# Patient Record
Sex: MALE | Race: WHITE | NOT HISPANIC OR LATINO | URBAN - NONMETROPOLITAN AREA
[De-identification: names, ages, dates, MRNs, and addresses within clinical notes are randomized per-mention and may not be internally consistent; named-entity substitution may affect disease eponyms.]

---

## 2018-08-15 ENCOUNTER — APPOINTMENT (RX ONLY)
Dept: URBAN - NONMETROPOLITAN AREA CLINIC 28 | Facility: CLINIC | Age: 63
Setting detail: DERMATOLOGY
End: 2018-08-15

## 2018-08-15 VITALS — WEIGHT: 180 LBS | HEIGHT: 72 IN

## 2018-08-15 DIAGNOSIS — B36.0 PITYRIASIS VERSICOLOR: ICD-10-CM

## 2018-08-15 PROBLEM — L23.7 ALLERGIC CONTACT DERMATITIS DUE TO PLANTS, EXCEPT FOOD: Status: ACTIVE | Noted: 2018-08-15

## 2018-08-15 PROBLEM — J30.1 ALLERGIC RHINITIS DUE TO POLLEN: Status: ACTIVE | Noted: 2018-08-15

## 2018-08-15 PROCEDURE — ? PRESCRIPTION

## 2018-08-15 PROCEDURE — ? KOH PREP

## 2018-08-15 PROCEDURE — 99202 OFFICE O/P NEW SF 15 MIN: CPT

## 2018-08-15 PROCEDURE — 87220 TISSUE EXAM FOR FUNGI: CPT

## 2018-08-15 PROCEDURE — ? TREATMENT REGIMEN

## 2018-08-15 RX ORDER — KETOCONAZOLE 20 MG/G
CREAM TOPICAL BID
Qty: 1 | Refills: 1 | Status: ERX | COMMUNITY
Start: 2018-08-15

## 2018-08-15 RX ORDER — FLUCONAZOLE 150 MG/1
TABLET ORAL
Qty: 4 | Refills: 1 | Status: ERX | COMMUNITY
Start: 2018-08-15

## 2018-08-15 RX ADMIN — KETOCONAZOLE: 20 CREAM TOPICAL at 00:00

## 2018-08-15 RX ADMIN — FLUCONAZOLE: 150 TABLET ORAL at 00:00

## 2018-08-15 ASSESSMENT — LOCATION SIMPLE DESCRIPTION DERM: LOCATION SIMPLE: CHEST

## 2018-08-15 ASSESSMENT — LOCATION ZONE DERM: LOCATION ZONE: TRUNK

## 2018-08-15 ASSESSMENT — LOCATION DETAILED DESCRIPTION DERM: LOCATION DETAILED: STERNUM

## 2018-08-15 NOTE — PROCEDURE: KOH PREP
Koh Intro Text (From The.....): A KOH prep was ordered and evaluated from the
Koh Procedure Text (Tissue Harvesting Technique): A 15-blade scalpel was used to scrape the skin. The skin scrapings were placed on a glass slide, covered with a coverslip and a KOH solution was applied.
Detail Level: Detailed
Showing: positive findings within normal realm